# Patient Record
Sex: FEMALE | Race: WHITE
[De-identification: names, ages, dates, MRNs, and addresses within clinical notes are randomized per-mention and may not be internally consistent; named-entity substitution may affect disease eponyms.]

---

## 2018-04-22 ENCOUNTER — HOSPITAL ENCOUNTER (EMERGENCY)
Dept: HOSPITAL 50 - VM.ED | Age: 83
Discharge: HOME | End: 2018-04-22
Payer: MEDICARE

## 2018-04-22 DIAGNOSIS — Z79.899: ICD-10-CM

## 2018-04-22 DIAGNOSIS — S52.501A: Primary | ICD-10-CM
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
## 2018-04-22 NOTE — EDM.PDOC
ED HPI GENERAL MEDICAL PROBLEM





- General


Chief Complaint: Upper Extremity Injury/Pain


Stated Complaint: right wrist pain


Time Seen by Provider: 04/22/18 12:07


Source of Information: Reports: Patient


History Limitations: Reports: No Limitations





- History of Present Illness


INITIAL COMMENTS - FREE TEXT/NARRATIVE: 


Pt. presents to ER with complaints of R wrist pain post fall. She states that 

the pain is located in dorsum of the wrist. Denies striking her head. No neck 

pain.


Pt. states that she is not experiencing any numbness/tingling in her R upper 

extremity.


Onset: Today


Onset Date: 04/22/18


Onset Time: 06:30


Location: Reports: Upper Extremity, Right


Quality: Reports: Ache, Throbbing


Severity: Mild


Improves with: Reports: None, Rest


Worsens with: Reports: Movement


Context: Reports: Trauma


  ** Right Wrist


Pain Score (Numeric/FACES): 4





- Related Data


 Allergies











Allergy/AdvReac Type Severity Reaction Status Date / Time


 


No Known Allergies Allergy   Verified 04/22/18 12:06











Home Meds: 


 Home Meds





Timolol [Betimol] 5 ml OP DAILY 10/29/14 [History]


Sennosides/Docusate Sodium [Senna-Docusate Sodium] 1 each PO DAILY #30 tablet 09 /08/16 [Rx]


Acetaminophen [Tylenol] 2 tab PO ASDIRECTED PRN 04/22/18 [History]


Brinzolamide/Brimonidine Tart [Simbrinza 1%-0.2% Eye Drops] 8 ml OP ASDIRECTED 

04/22/18 [History]


Carboxymethylcellulose Sodium [Refresh Liquigel 1% Ophth Soln] 15 ml OP 

ASDIRECTED 04/22/18 [History]


Dextromethorphan Polistirex [Delsym] 30 mg PO ASDIRECTED 04/22/18 [History]


Diphenoxylate HCl/Atropine [Diphenoxylate-Atrop 2.5-0.025] 1 each PO ASDIRECTED 

04/22/18 [History]


Memantine [Namenda] 10 mg PO BID 04/22/18 [History]


Multivitamin [Daily Multiple Vitamin] 1 each PO DAILY 04/22/18 [History]


Naproxen Sodium [Aleve] 220 mg PO ASDIRECTED 04/22/18 [History]











Past Medical History


HEENT History: Reports: Impaired Vision


Neurological History: Reports: Alzheimers Disease





Social & Family History





- Tobacco Use


Smoking Status *Q: Never Smoker





Review of Systems





- Review of Systems


Review Of Systems: See Below


Musculoskeletal: Reports: Joint Pain (R wrist)


Neurological: Reports: No Symptoms





ED EXAM, GENERAL





- Physical Exam


Exam: See Below


Exam Limited By: No Limitations


General Appearance: Alert, WD/WN, No Apparent Distress


Peripheral Pulses: 4+: Radial (L), Radial (R)


Extremities: Joint Swelling, Arm Pain (R wrist), Limited Range of Motion


Neurological: Alert, Oriented, CN II-XII Intact, Normal Cognition, Normal Gait, 

Normal Reflexes, No Motor/Sensory Deficits


Skin Exam: Warm, Dry, Intact, Normal Color, No Rash





Course





- Vital Signs


Last Recorded V/S: 


 Last Vital Signs











Temp  36.1 C   04/22/18 12:07


 


Pulse  52 L  04/22/18 12:07


 


Resp  18   04/22/18 12:07


 


BP  176/83 H  04/22/18 12:07


 


Pulse Ox  96   04/22/18 12:07














- Orders/Labs/Meds


Orders: 


 Active Orders 24 hr











 Category Date Time Status


 


 Wrist Comp Min 3V Rt [CR] Stat Exams  04/22/18 12:18 Taken














Departure





- Departure


Time of Disposition: 13:25


Disposition: Home, Self-Care 01


Condition: Good


Clinical Impression: 


 Buckle fracture of distal end of right radius








- Discharge Information


Instructions:  Wrist Splint, Adult, Wrist Fracture Treated With Immobilization, 

Cast or Splint Care, Adult


Referrals: 


Rachel Pinedo DO [Primary Care Provider] - 


Forms:  ED Department Discharge


Additional Instructions: 


Keep splint on all the time.





Follow-up in clinic in 7 days for recheck.





Tylenol 650mg every 4-6 hours.











- My Orders


Last 24 Hours: 


My Active Orders





04/22/18 12:18


Wrist Comp Min 3V Rt [CR] Stat 














- Assessment/Plan


Last 24 Hours: 


My Active Orders





04/22/18 12:18


Wrist Comp Min 3V Rt [CR] Stat

## 2018-08-17 ENCOUNTER — HOSPITAL ENCOUNTER (EMERGENCY)
Dept: HOSPITAL 50 - VM.ED | Age: 83
Discharge: ANOTHER HOSPITAL | End: 2018-08-17
Payer: MEDICARE

## 2018-08-17 DIAGNOSIS — Z88.8: ICD-10-CM
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
## 2018-08-17 DIAGNOSIS — S32.9XXA: Primary | ICD-10-CM

## 2018-08-17 DIAGNOSIS — Z79.899: ICD-10-CM

## 2018-08-17 LAB
ANION GAP SERPL CALC-SCNC: 11.8 MMOL/L (ref 10–20)
CHLORIDE SERPL-SCNC: 103 MMOL/L (ref 98–107)
SODIUM SERPL-SCNC: 140 MMOL/L (ref 136–145)

## 2018-08-17 PROCEDURE — 85025 COMPLETE CBC W/AUTO DIFF WBC: CPT

## 2018-08-17 PROCEDURE — 99285 EMERGENCY DEPT VISIT HI MDM: CPT

## 2018-08-17 PROCEDURE — 85610 PROTHROMBIN TIME: CPT

## 2018-08-17 PROCEDURE — 96376 TX/PRO/DX INJ SAME DRUG ADON: CPT

## 2018-08-17 PROCEDURE — 96365 THER/PROPH/DIAG IV INF INIT: CPT

## 2018-08-17 PROCEDURE — 96375 TX/PRO/DX INJ NEW DRUG ADDON: CPT

## 2018-08-17 PROCEDURE — 80053 COMPREHEN METABOLIC PANEL: CPT

## 2018-08-17 PROCEDURE — 73501 X-RAY EXAM HIP UNI 1 VIEW: CPT

## 2018-08-17 PROCEDURE — 36415 COLL VENOUS BLD VENIPUNCTURE: CPT

## 2018-08-17 NOTE — EDM.PDOC
ED HPI GENERAL MEDICAL PROBLEM





- General


Chief Complaint: Lower Extremity Injury/Pain


Stated Complaint: right hip pain 


Time Seen by Provider: 08/17/18 17:47


Source of Information: Reports: Patient





- History of Present Illness


INITIAL COMMENTS - FREE TEXT/NARRATIVE: 





Patient comes into the emergency department by EMS with complaint of right hip 

pain. Posttraumatic fall from the seated position. Patient was on her way to 

the dining room for super and ended up falling out of her chair after she was 

reaching for her walker/wheelchair device. Patient denies hitting her head or 

losing consciousness. Staff were present at the time of the fall. She states 

that she noticed that she had right hip pain instantly after she fell. She 

states that she is unable to complete range of motion. She states that she did 

not hear any pop or snap when she hit the floor. EMS was contacted and patient 

was transported by backboard. Pt is a poor historian for she has advance 

dementia. Staff noted the pain and called EMS and did not try to move her. 





Last ate 3pm according to nursing home staff 


Onset: Sudden


Location: Reports: Lower Extremity, Right


Quality: Reports: Sharp, Stabbing


Improves with: Reports: Immobilization


Worsens with: Reports: None


Associated Symptoms: Denies: Confusion, Diaphoresis, Nausea/Vomiting, Shortness 

of Breath, Syncope, Weakness





- Related Data


 Allergies











Allergy/AdvReac Type Severity Reaction Status Date / Time


 


mivacurium Allergy  Cannot Verified 08/17/18 19:29





   Remember  


 


succinylcholine Allergy  Cannot Verified 08/17/18 19:29





   Remember  


 


sulfamethoxazole Allergy  Cannot Verified 08/17/18 19:29





[From Bactrim]   Remember  


 


trimethoprim [From Bactrim] Allergy  Cannot Verified 08/17/18 19:29





   Remember  











Home Meds: 


 Home Meds





Timolol [Betimol] 1 drop EYEBOTH DAILY 10/29/14 [History]


Acetaminophen [Tylenol] 2 tab PO ASDIRECTED PRN 04/22/18 [History]


Brinzolamide/Brimonidine Tart [Simbrinza 1%-0.2% Eye Drops] 8 ml EYEBOTH TID 04/ 22/18 [History]


Carboxymethylcellulose Sodium [Refresh Liquigel 1% Ophth Soln] 1 drop EYELF 

DAILY 04/22/18 [History]


Dextromethorphan Polistirex [Delsym] 30 mg PO ASDIRECTED PRN 04/22/18 [History]


Diphenoxylate HCl/Atropine [Diphenoxylate-Atrop 2.5-0.025] 1 each PO ASDIRECTED 

PRN 04/22/18 [History]


Memantine [Namenda] 10 mg PO BID 04/22/18 [History]


Multivitamin [Daily Multiple Vitamin] 1 each PO DAILY 04/22/18 [History]


Naproxen Sodium [Aleve] 220 mg PO ASDIRECTED PRN 04/22/18 [History]


Escitalopram [Lexapro] 20 mg PO DAILY 08/17/18 [History]


Sennosides/Docusate Sodium [Senna-Docusate Sodium] 1 each PO DAILY PRN 08/17/18 

[History]











Past Medical History


HEENT History: Reports: Impaired Vision


Neurological History: Reports: Alzheimers Disease





Review of Systems





- Review of Systems


Review Of Systems: See Below


Constitutional: Reports: No Symptoms


Eyes: Reports: No Symptoms


Ears: Reports: No Symptoms


Nose: Reports: No Symptoms


Mouth/Throat: Reports: No Symptoms


Respiratory: Reports: No Symptoms


Cardiovascular: Reports: No Symptoms


GI/Abdominal: Reports: No Symptoms


Genitourinary: Reports: No Symptoms


Musculoskeletal: Reports: Leg Pain


Skin: Reports: No Symptoms


Neurological: Reports: No Symptoms


Psychiatric: Reports: No Symptoms





ED EXAM, GENERAL





- Physical Exam


Exam: See Below


Exam Limited By: No Limitations


General Appearance: Alert, WD/WN, No Apparent Distress


Eye Exam: Bilateral Eye: EOMI, PERRL


Head: Atraumatic, Normocephalic


Neck: Normal Inspection, Supple, Non-Tender, Full Range of Motion.  No: Limited 

Range of Motion, Lymphadenopathy (L), Lymphadenopathy (R)


Respiratory/Chest: No Respiratory Distress, Normal Breath Sounds, No Accessory 

Muscle Use


Cardiovascular: Normal Peripheral Pulses, Regular Rate, Rhythm, No Edema


Peripheral Pulses: 3+: Posterior Tibial (L), Posterior Tibial (R), Dorsalis 

Pedis (L), Dorsalis Pedis (R)


GI/Abdominal: Normal Bowel Sounds, Soft, Non-Tender, No Distention


Extremities: Normal Inspection, Normal Range of Motion, Non-Tender, No Pedal 

Edema, Normal Capillary Refill, Leg Pain (right hip pain- very limited ROM, 

pain upon palpation to right hip and greater trocanter region. No deformity or 

shortening noted), Limited Range of Motion (right hip extreme limitation )


Neurological: Alert, Other (disoriented to place and time aware of who she was 

and could discuss her past with ease)


Skin Exam: Warm, Dry, Intact





Course





- Vital Signs


Last Recorded V/S: 


 Last Vital Signs











Temp  37.7 C   08/17/18 18:00


 


Pulse  68   08/17/18 18:45


 


Resp  16   08/17/18 18:45


 


BP  148/80 H  08/17/18 18:45


 


Pulse Ox  95   08/17/18 18:45














- Orders/Labs/Meds


Orders: 


 Active Orders 24 hr











 Category Date Time Status


 


 Hip Min 1V w Pelvis Rt [CR] Stat Exams  08/17/18 17:55 Taken


 


 Sodium Chloride 0.9% [Normal Saline] 1,000 ml Med  08/17/18 19:30 Active





 IV ASDIRECTED   


 


 Sodium Chloride 0.9% [Saline Flush] Med  08/17/18 17:55 Active





 10 ml FLUSH ASDIRECTED PRN   


 


 Peripheral IV Insertion Adult [OM.PC] Stat Oth  08/17/18 17:55 Ordered








 Medication Orders





Sodium Chloride (Normal Saline)  1,000 mls @ 125 mls/hr IV ASDIRECTED JOY


   Last Admin: 08/17/18 19:31  Dose: 125 mls/hr


Sodium Chloride (Saline Flush)  10 ml FLUSH ASDIRECTED PRN


   PRN Reason: Keep Vein Open








Labs: 


 Laboratory Tests











  08/17/18 08/17/18 08/17/18 Range/Units





  18:08 18:08 18:08 


 


WBC  6.9    (4.0-10.0)  x10^3/uL


 


RBC  4.31    (4.00-5.50)  x10^6/uL


 


Hgb  14.1    (12.0-16.0)  g/dL


 


Hct  40.6    (33.0-47.0)  %


 


MCV  94.2 H    (78.0-93.0)  fL


 


MCH  32.7 H    (26.0-32.0)  pg


 


MCHC  34.7    (32.0-36.0)  g/dL


 


RDW Coeff of Moe  12.7    (10.0-15.0)  %


 


Plt Count  148    (130-400)  x10^3/uL


 


Neut % (Auto)  50.6    (50.0-80.0)  %


 


Lymph % (Auto)  37.2    (25.0-50.0)  %


 


Mono % (Auto)  10.8    (2.0-11.0)  %


 


Eos % (Auto)  1.0    (0.0-4.0)  %


 


Baso % (Auto)  0.4    (0.2-1.2)  %


 


PT   9.5 L   (9.6-11.4)  SEC


 


INR   0.9 L   (2.0-3.5)  


 


Sodium    140  (136-145)  mmol/L


 


Potassium    3.8  (3.5-5.1)  mmol/L


 


Chloride    103  ()  mmol/L


 


Carbon Dioxide    29  (21-32)  mmol/L


 


Anion Gap    11.8  (10-20)  mmol/L


 


BUN    17  (7-18)  mg/dL


 


Creatinine    1.0  (0.55-1.02)  mg/dL


 


Est Cr Clr Drug Dosing    TNP  


 


Estimated GFR (MDRD)    53  


 


Glucose    112 H  ()  mg/dL


 


Calcium    8.6  (8.5-10.1)  mg/dL


 


Corrected Calcium    9.08  (8.5-10.1)  mg/dL


 


Total Bilirubin    0.4  (0.2-1.0)  mg/dL


 


AST    17  (15-37)  U/L


 


ALT    24  (14-59)  U/L


 


Alkaline Phosphatase    79  ()  U/L


 


Total Protein    7.4  (6.4-8.2)  g/dL


 


Albumin    3.4  (3.4-5.0)  g/dL


 


Globulin    4.0  


 


Albumin/Globulin Ratio    0.85  











Meds: 


Medications











Generic Name Dose Route Start Last Admin





  Trade Name Freq  PRN Reason Stop Dose Admin


 


Sodium Chloride  1,000 mls @ 125 mls/hr  08/17/18 19:30  08/17/18 19:31





  Normal Saline  IV   125 mls/hr





  ASDIRECTED JOY   Administration





     





     





     





     


 


Sodium Chloride  10 ml  08/17/18 17:55  





  Saline Flush  FLUSH   





  ASDIRECTED PRN   





  Keep Vein Open   





     





     





     














Discontinued Medications














Generic Name Dose Route Start Last Admin





  Trade Name Freq  PRN Reason Stop Dose Admin


 


Morphine Sulfate  1 mg  08/17/18 17:55  08/17/18 18:12





  Morphine  IVPUSH  08/17/18 17:56  1 mg





  ONETIME ONE   Administration





     





     





     





     


 


Morphine Sulfate  1 mg  08/17/18 19:22  08/17/18 19:27





  Morphine  IVPUSH  08/17/18 19:23  1 mg





  ONETIME ONE   Administration





     





     





     





     


 


Ondansetron HCl  4 mg  08/17/18 17:55  08/17/18 18:10





  Zofran  IVPUSH  08/17/18 17:56  4 mg





  ONETIME ONE   Administration





     





     





     





     














Departure





- Departure


Time of Disposition: 19:45


Disposition: DC/[facility]fer to [facility]


Condition: Fair


Clinical Impression: 


 Fracture of pelvis








- Discharge Information


*PRESCRIPTION DRUG MONITORING PROGRAM REVIEWED*: Not Applicable


*COPY OF PRESCRIPTION DRUG MONITORING REPORT IN PATIENT AKI: Not Applicable


Referrals: 


Rachel Pinedo DO [Primary Care Provider] - 


Forms:  Interfacility Transfer EMTALA, ED Department Discharge





- My Orders


Last 24 Hours: 


My Active Orders





08/17/18 17:55


Hip Min 1V w Pelvis Rt [CR] Stat 


Sodium Chloride 0.9% [Saline Flush]   10 ml FLUSH ASDIRECTED PRN 


Peripheral IV Insertion Adult [OM.PC] Stat 





08/17/18 19:30


Sodium Chloride 0.9% [Normal Saline] 1,000 ml IV ASDIRECTED 














- Assessment/Plan


Last 24 Hours: 


My Active Orders





08/17/18 17:55


Hip Min 1V w Pelvis Rt [CR] Stat 


Sodium Chloride 0.9% [Saline Flush]   10 ml FLUSH ASDIRECTED PRN 


Peripheral IV Insertion Adult [OM.PC] Stat 





08/17/18 19:30


Sodium Chloride 0.9% [Normal Saline] 1,000 ml IV ASDIRECTED 











Assessment:: 





1. Right hip pain 


Plan: 





1. Labs completed in ER results discussed with pt and son 


2. X-ray completed in ER results discussed with the pt and son


3. IV started with pain and anti-nausea medication given 


5. Pelvic girdle applied for pelvis stability 


4. Consult completed with [facility] orthopedic surgeon: Dr. Dial did agree-

this pt needed higher level of care and surgical intervention Medical services 

with admit the pt.